# Patient Record
Sex: FEMALE | Race: BLACK OR AFRICAN AMERICAN | NOT HISPANIC OR LATINO | URBAN - NONMETROPOLITAN AREA
[De-identification: names, ages, dates, MRNs, and addresses within clinical notes are randomized per-mention and may not be internally consistent; named-entity substitution may affect disease eponyms.]

---

## 2021-06-18 ENCOUNTER — IMPORTED ENCOUNTER (OUTPATIENT)
Dept: URBAN - NONMETROPOLITAN AREA CLINIC 1 | Facility: CLINIC | Age: 61
End: 2021-06-18

## 2021-06-18 PROBLEM — H25.813: Noted: 2021-06-18

## 2021-06-18 PROBLEM — H52.4: Noted: 2021-06-18

## 2021-06-18 PROBLEM — H52.223: Noted: 2021-06-18

## 2021-06-18 PROBLEM — H52.03: Noted: 2021-06-18

## 2021-06-18 PROCEDURE — 92015 DETERMINE REFRACTIVE STATE: CPT

## 2021-06-18 PROCEDURE — 92004 COMPRE OPH EXAM NEW PT 1/>: CPT

## 2021-06-18 NOTE — PATIENT DISCUSSION
Hyperopia/astigmatism/presbyopia -Discussed diagnosis with patient. Updated spec Rx given. Recommend lens that will provide comfort as well as protect safety and health of eyes. Cataract OU-Not yet surgical. -Reviewed symptoms of advancing cataract growth such as glare and halos and decreased vision.-discussed protecting eyes from sunlight -Continue to monitor for now. Pt will notify us if any new symptoms develop.

## 2022-04-10 ASSESSMENT — TONOMETRY
OS_IOP_MMHG: 17
OD_IOP_MMHG: 17

## 2022-04-10 ASSESSMENT — VISUAL ACUITY
OS_SC: 20/20
OS_CC: 20/70
OD_SC: 20/30

## 2022-09-21 ENCOUNTER — ESTABLISHED PATIENT (OUTPATIENT)
Dept: RURAL CLINIC 2 | Facility: CLINIC | Age: 62
End: 2022-09-21

## 2022-09-21 DIAGNOSIS — H52.223: ICD-10-CM

## 2022-09-21 DIAGNOSIS — H52.03: ICD-10-CM

## 2022-09-21 DIAGNOSIS — H52.4: ICD-10-CM

## 2022-09-21 DIAGNOSIS — H25.813: ICD-10-CM

## 2022-09-21 DIAGNOSIS — H04.213: ICD-10-CM

## 2022-09-21 PROCEDURE — 99214 OFFICE O/P EST MOD 30 MIN: CPT

## 2022-09-21 PROCEDURE — 92015 DETERMINE REFRACTIVE STATE: CPT

## 2022-09-21 ASSESSMENT — TONOMETRY
OD_IOP_MMHG: 15
OS_IOP_MMHG: 15

## 2022-09-21 ASSESSMENT — VISUAL ACUITY
OD_CC: 20/20
OU_CC: J2
OS_CC: 20/25

## 2022-09-21 NOTE — PATIENT DISCUSSION
Start ALREX TID OU until seen again.  If NI, consider punctal dilation given narrow openings noted today.  Copay card given.

## 2023-07-05 ENCOUNTER — ESTABLISHED PATIENT (OUTPATIENT)
Dept: RURAL CLINIC 2 | Facility: CLINIC | Age: 63
End: 2023-07-05

## 2023-07-05 DIAGNOSIS — H52.4: ICD-10-CM

## 2023-07-05 DIAGNOSIS — H25.813: ICD-10-CM

## 2023-07-05 DIAGNOSIS — H52.03: ICD-10-CM

## 2023-07-05 DIAGNOSIS — H04.223: ICD-10-CM

## 2023-07-05 PROCEDURE — 99214 OFFICE O/P EST MOD 30 MIN: CPT

## 2023-07-05 PROCEDURE — 92015 DETERMINE REFRACTIVE STATE: CPT

## 2023-07-05 ASSESSMENT — TONOMETRY
OD_IOP_MMHG: 15
OS_IOP_MMHG: 13

## 2023-07-05 ASSESSMENT — VISUAL ACUITY
OD_CC: 20/20
OS_CC: 20/20